# Patient Record
(demographics unavailable — no encounter records)

---

## 2024-10-08 NOTE — PLAN
[TextEntry] : For now, continue CPAP. Will review compliance in 3 weeks. Reviewed other trts even though CPAP is first line therapy; reviewed OA, surgical options mainly Inspire. He would like to pursue Inspire.   Weight loss.

## 2024-10-08 NOTE — ASSESSMENT
[FreeTextEntry1] : Severe BRENDA; needs trt. Doing well thus far on CPAP but does not like it and would like an alternative trt.

## 2024-10-08 NOTE — REVIEW OF SYSTEMS
[Negative] : Neurologic [FreeTextEntry3] : per HPI [FreeTextEntry8] : per HPI [de-identified] : per HPI [de-identified] : per HPI [de-identified] : per HPI

## 2024-10-08 NOTE — HISTORY OF PRESENT ILLNESS
[Daytime Somnolence] : daytime somnolence [AHI: ___ per hour] : Apnea-hypopnea index:  [unfilled] per hour [T90%: ___] : T90%: [unfilled]% [FreeTextEntry1] : Witnessed apneas while sleeping, heavy snoring, EDS with ESS 15, fragmented sleep.   PSG 8/2024 with severe BRENDA. CPAP study 9/8/24 showed CPAP 16 optimal; hypoxia resolved.   Got the CPAP on 9/19/24. Using it every night .Therapeutic AHI WNL. Compliance has been excellent since he got it.  He does not like using the CPAP. Would like an alternative trt.   To bed: varies; 9:30-2 am Latency: < 5min OOB: 7-8 am unless needs to get up to play golf  BMI >30. Hx HIV.   No respiratory issues during the day.  Quit smoking 10/2007. [Date: ___] : the most recent therapeutic polysomnogram was completed [unfilled] [CPAP: ___ cmH2O] : CPAP: [unfilled] cmH2O [Therapy based AHI: ___ /hr] : Therapy based AHI: [unfilled] / hr [ESS] : 15

## 2024-10-21 NOTE — PHYSICAL EXAM
[Midline] : trachea located in midline position [Normal] : no rashes [FreeTextEntry1] : overweight, NAD  [Hearing Loss Right Only] : normal [Hearing Loss Left Only] : normal [FreeTextEntry8] : foreign body present.  Removed (Cotton swab tip). [de-identified] : 3+ bilateral tonsillar hypertrophy  [de-identified] : Mallampati score IV

## 2024-10-21 NOTE — CONSULT LETTER
[Dear  ___] : Dear  [unfilled], [Consult Letter:] : I had the pleasure of evaluating your patient, [unfilled]. [Please see my note below.] : Please see my note below. [Consult Closing:] : Thank you very much for allowing me to participate in the care of this patient.  If you have any questions, please do not hesitate to contact me. [Sincerely,] : Sincerely, [DrFarzana  ___] : Dr. VELASQUEZ [FreeTextEntry2] : Gigi Mcgee MD ([facility] Physician)  [FreeTextEntry3] : Surekha Gonsalez PA-C Department of Otolaryngology [facility] at [place]   Reuben Nelson MD, FACS Chief of Otolaryngology at [facility]  Dept. of Otolaryngology [facility]

## 2024-10-21 NOTE — ASSESSMENT
[FreeTextEntry1] : Patient is not a candidate for Inspire based on proportion of central sleep apneas.   Foreign body debrided, appears as a cotton swab.

## 2024-10-21 NOTE — HISTORY OF PRESENT ILLNESS
[Obstructive Sleep Apnea] : Obstructive Sleep Apnea [Witnessed Apneas] : witnessed sleep apnea [Frequent Nocturnal Awakening] : frequent nocturnal awakening [Daytime Somnolence] : daytime somnolence [Unintentional Sleep While Inactive] : unintentional sleep while inactive [Awakes Unrefreshed] : awakening unrefreshed [Awakening With Dry Mouth] : awakening with dry mouth [Recent  Weight Gain] : recent weight gain [de-identified] : 63M presents for an BRENDA evaluation.  Patient had sleep study conducted on 8/3/24 showing an AHI of 42.7  Patient has a BMI of 33.23 Patient has tried a CPAP machine with minimal relief.   Patient has tried various types of masks without relief  States mask feels intrusive and causes him to feel "closed in" and dry mouth  Patient states tried losing weight, approximately 17 lbs. in the past with no relief from sleep apnea  Patient comorbidities include weight gain, daytime fatigue [Snoring] : no snoring